# Patient Record
(demographics unavailable — no encounter records)

---

## 2024-10-21 NOTE — PHYSICAL EXAM
[Well Developed] : well developed [Well Nourished] : well nourished [No Acute Distress] : no acute distress [Normal Conjunctiva] : normal conjunctiva [Normal Venous Pressure] : normal venous pressure [No Carotid Bruit] : no carotid bruit [Normal S1, S2] : normal S1, S2 [No Murmur] : no murmur [No Rub] : no rub [No Gallop] : no gallop [Clear Lung Fields] : clear lung fields [Good Air Entry] : good air entry [No Respiratory Distress] : no respiratory distress  [Soft] : abdomen soft [Non Tender] : non-tender [No Masses/organomegaly] : no masses/organomegaly [Normal Bowel Sounds] : normal bowel sounds [Normal Gait] : normal gait [No Edema] : no edema [No Cyanosis] : no cyanosis [No Clubbing] : no clubbing [No Varicosities] : no varicosities [No Rash] : no rash [No Skin Lesions] : no skin lesions [Moves all extremities] : moves all extremities [No Focal Deficits] : no focal deficits [Normal Speech] : normal speech [Alert and Oriented] : alert and oriented [Normal memory] : normal memory [de-identified] : Sternal scar with keloid formation

## 2024-10-21 NOTE — HISTORY OF PRESENT ILLNESS
[FreeTextEntry1] : 73 yo man presents for CV follow-up, s/p CABG surgery.   Contact info: Cell [phone], Josi Sheffield. Home is [phone].  H/o CAD, s/p MI and PCI  to OM1 2009. Former smoker; alcohol abuse.  Due to worsening angina, cardiac catheterization (Dr. Dashawn Kauffman, [facility]) 3/16/21: 20% left main 60% oLAD, 40%pLAD (iFR 0.89 signiifcant) 50%LCX 20%pRCA No LAD PCI performed (too complex). He was evaluated by Dr. Robert Higginbotham ([facility] cardiac surgery) and underwent CABG X 2 (LIMA to LAD, SVG to OM) on 4/28/21. Normal LVEF by echo. Post op course notable for AFib, converted back to SR.   Labs April 2023: A1c 5.8% TSH normal tchol 144, trig 190, HDL 36, LDL 70 mg/dL CMP normal  At his last visit with me in Sept 2024, the following issues were discussed: CAD (coronary artery disease) (414.00) (I25.10) CAD s/p CABG 2021; did well post-op, brief AFib. No further AFib. Continue with     aggressive secondary prevention, including diet, increasing exercise, med rx, weight     loss. His sternal chest discomfort is likely from keloid formation. He has stopped     drinking alcohol; his BP is stable; compliant with medications. Dyslipidemia (272.4) (E78.5) Continue with high-dose statin therapy + diet. Labs f/b PCP. Goal LDL-C ~50 mg/dL. Hypertension (401.9) (I10) BP under good control, yamile now not drinking alcohol. Continue to watch. Will d/c metop     given bradycardia, and monitor BP. Peripheral vascular disease (443.9) (I73.9) Bilateral, with back discomfort. Symptoms are c/w back issues and vein excision site.     Will check RORY's, regardless. S/P CABG x 2 (V45.81) (Z95.1) Doing well s/p CABG; doing well. No angina. Needs more exercise; weight control. Sinus bradycardia (427.89) (R00.1) Sinus yevgeniy to the 40's. Perhaps some mild dizziness. BP low/normal. Instructed him /     wife to stop metoprolol and RTC in one month, or sooner if needed. Obesity, unspecified classification, unspecified obesity type, unspecified whether serious comorbidity present (278.00) (E66.9) Suggest weight control, including less fried chicken.  Since his last visit, Mr. Sheffield, doing fine. He stopped the metoprolol at last visit, as instructed.  Continues to have itching at sternal scar site. Does not check BP at home. Compliant with medications.  Labs are f/b PCP.  EKG today:  Sinus Rhythm  LAFB Nonspecific T wave abnormalities ABNORMAL

## 2024-10-21 NOTE — HISTORY OF PRESENT ILLNESS
[FreeTextEntry1] : 73 yo man presents for CV follow-up, s/p CABG surgery.   Contact info: Cell [phone], Josi Sheffield. Home is [phone].  H/o CAD, s/p MI and PCI  to OM1 2009. Former smoker; alcohol abuse.  Due to worsening angina, cardiac catheterization (Dr. Dashawn Kauffman, [facility]) 3/16/21: 20% left main 60% oLAD, 40%pLAD (iFR 0.89 signiifcant) 50%LCX 20%pRCA No LAD PCI performed (too complex). He was evaluated by Dr. Robert Higginbotham ([facility] cardiac surgery) and underwent CABG X 2 (LIMA to LAD, SVG to OM) on 4/28/21. Normal LVEF by echo. Post op course notable for AFib, converted back to SR.   Labs April 2023: A1c 5.8% TSH normal tchol 144, trig 190, HDL 36, LDL 70 mg/dL CMP normal  At his last visit with me in Sept 2024, the following issues were discussed: CAD (coronary artery disease) (414.00) (I25.10) CAD s/p CABG 2021; did well post-op, brief AFib. No further AFib. Continue with     aggressive secondary prevention, including diet, increasing exercise, med rx, weight     loss. His sternal chest discomfort is likely from keloid formation. He has stopped     drinking alcohol; his BP is stable; compliant with medications. Dyslipidemia (272.4) (E78.5) Continue with high-dose statin therapy + diet. Labs f/b PCP. Goal LDL-C ~50 mg/dL. Hypertension (401.9) (I10) BP under good control, yamile now not drinking alcohol. Continue to watch. Will d/c metop     given bradycardia, and monitor BP. Peripheral vascular disease (443.9) (I73.9) Bilateral, with back discomfort. Symptoms are c/w back issues and vein excision site.     Will check RORY's, regardless. S/P CABG x 2 (V45.81) (Z95.1) Doing well s/p CABG; doing well. No angina. Needs more exercise; weight control. Sinus bradycardia (427.89) (R00.1) Sinus yevgeniy to the 40's. Perhaps some mild dizziness. BP low/normal. Instructed him /     wife to stop metoprolol and RTC in one month, or sooner if needed. Obesity, unspecified classification, unspecified obesity type, unspecified whether serious comorbidity present (278.00) (E66.9) Suggest weight control, including less fried chicken.  Since his last visit, Mr. Sheffield, doing fine. He stopped the metoprolol at last visit, as instructed.  Continues to have itching at sternal scar site. Does not check BP at home. Compliant with medications.  Labs are f/b PCP.  EKG today:  Sinus Rhythm  LAFB Nonspecific T wave abnormalities ABNORMAL

## 2024-10-21 NOTE — PHYSICAL EXAM
[Well Developed] : well developed [Well Nourished] : well nourished [No Acute Distress] : no acute distress [Normal Conjunctiva] : normal conjunctiva [Normal Venous Pressure] : normal venous pressure [No Carotid Bruit] : no carotid bruit [Normal S1, S2] : normal S1, S2 [No Murmur] : no murmur [No Rub] : no rub [No Gallop] : no gallop [Clear Lung Fields] : clear lung fields [Good Air Entry] : good air entry [No Respiratory Distress] : no respiratory distress  [Soft] : abdomen soft [Non Tender] : non-tender [No Masses/organomegaly] : no masses/organomegaly [Normal Bowel Sounds] : normal bowel sounds [Normal Gait] : normal gait [No Edema] : no edema [No Cyanosis] : no cyanosis [No Clubbing] : no clubbing [No Varicosities] : no varicosities [No Rash] : no rash [No Skin Lesions] : no skin lesions [Moves all extremities] : moves all extremities [No Focal Deficits] : no focal deficits [Normal Speech] : normal speech [Alert and Oriented] : alert and oriented [Normal memory] : normal memory [de-identified] : Sternal scar with keloid formation

## 2024-10-21 NOTE — REVIEW OF SYSTEMS
[SOB] : no shortness of breath [Dyspnea on exertion] : dyspnea during exertion [Chest Discomfort] : no chest discomfort [Lower Ext Edema] : no extremity edema [Leg Claudication] : no intermittent leg claudication [Palpitations] : no palpitations [Orthopnea] : no orthopnea [Syncope] : no syncope [Negative] : Heme/Lymph [FreeTextEntry9] : mild back discomfort

## 2025-02-10 NOTE — HISTORY OF PRESENT ILLNESS
[FreeTextEntry1] : 72 yo man presents for CV follow-up, s/p CABG surgery.   Contact info: Cell [phone], Josi Sheffield. Home is [phone].  H/o CAD, s/p MI and PCI  to OM1 2009. Former smoker; alcohol abuse.  Due to worsening angina, cardiac catheterization (Dr. Dashawn Kauffman, [facility]) 3/16/21: 20% left main 60% oLAD, 40%pLAD (iFR 0.89 signiifcant) 50%LCX 20%pRCA No LAD PCI performed (too complex). He was evaluated by Dr. Robert Higginbotham ([facility] cardiac surgery) and underwent CABG X 2 (LIMA to LAD, SVG to OM) on 4/28/21. Normal LVEF by echo. Post op course notable for AFib, converted back to SR.   Labs April 2023: A1c 5.8% TSH normal tchol 144, trig 190, HDL 36, LDL 70 mg/dL CMP normal  At his last visit with me in October 2024, the following issues were discussed: CAD (coronary artery disease) (414.00) (I25.10) CAD s/p CABG 2021; did well post-op, brief AFib. No further AFib. Continue with     aggressive secondary prevention, including diet, increasing exercise, med rx, weight     loss. His sternal chest discomfort is from keloid formation. He has stopped drinking     alcohol, thankfully; his BP is stable; compliant with medications. Dyslipidemia (272.4) (E78.5) Continue with high-dose statin therapy + diet. Labs f/b PCP. Goal LDL-C ~50 mg/dL. Hypertension (401.9) (I10) BP under good control, yamile now not drinking alcohol. Continue to monitor, especially     now that he is off the bblocker. Peripheral vascular disease (443.9) (I73.9) Bilateral, with back discomfort; somewhat improved as of late. Symptoms are c/w back     issues and vein excision site. Will check RORY results; done today. S/P CABG x 2 (V45.81) (Z95.1) Doing well s/p CABG; doing well. No angina. Needs more exercise; weight control. No     indication for CV testing at this time. History of sinus bradycardia (V12.59) (Z86.79) In past, sinus yevgeniy to the 40's. Now, HR in the 60's off metoprolol. Continue to monitor     off bblockers.  RORY's 10/2024: Right Lower Extremity Arteries: Resting right ankle-brachial index is within normal range. No evidence of significant right lower extremity arterial disease. The right toe-brachial index is normal. Pulse volume recording (PVR) waveforms appear multiphasic with normal amplitudes throughout the right lower extremity. There is no significant decrease in segmental pressures between arterial segments. Left Lower Extremity Arteries: Resting left ankle-brachial index is within normal range. No evidence of significant left lower extremity arterial disease. The left toe-brachial index is normal. Pulse volume recording (PVR) waveforms appear multiphasic with normal amplitudes throughout the left lower extremity. There is no significant decrease in segmental pressures between arterial segments.  Since his last visit, Mr. Sheffield has been doing well.  Weight stable. Drinking soda, with sugar. Eats small amount of food. Compliant with medications.  Not much exercise. No alcohol. No CV symptoms. Labs are f/b PCP, recently.  EKG today:  SR, LAD, nonspecific STT abn's

## 2025-02-10 NOTE — PHYSICAL EXAM
[Well Developed] : well developed [Well Nourished] : well nourished [No Acute Distress] : no acute distress [Normal Conjunctiva] : normal conjunctiva [Normal Venous Pressure] : normal venous pressure [No Carotid Bruit] : no carotid bruit [Normal S1, S2] : normal S1, S2 [No Murmur] : no murmur [No Rub] : no rub [No Gallop] : no gallop [Clear Lung Fields] : clear lung fields [Good Air Entry] : good air entry [No Respiratory Distress] : no respiratory distress  [Soft] : abdomen soft [Non Tender] : non-tender [No Masses/organomegaly] : no masses/organomegaly [Normal Bowel Sounds] : normal bowel sounds [Normal Gait] : normal gait [No Edema] : no edema [No Cyanosis] : no cyanosis [No Clubbing] : no clubbing [No Varicosities] : no varicosities [No Rash] : no rash [No Skin Lesions] : no skin lesions [Moves all extremities] : moves all extremities [No Focal Deficits] : no focal deficits [Normal Speech] : normal speech [Alert and Oriented] : alert and oriented [Normal memory] : normal memory [de-identified] : Sternal scar with keloid formation

## 2025-02-10 NOTE — PHYSICAL EXAM
[Well Developed] : well developed [Well Nourished] : well nourished [No Acute Distress] : no acute distress [Normal Conjunctiva] : normal conjunctiva [Normal Venous Pressure] : normal venous pressure [No Carotid Bruit] : no carotid bruit [Normal S1, S2] : normal S1, S2 [No Murmur] : no murmur [No Rub] : no rub [No Gallop] : no gallop [Clear Lung Fields] : clear lung fields [Good Air Entry] : good air entry [No Respiratory Distress] : no respiratory distress  [Soft] : abdomen soft [Non Tender] : non-tender [No Masses/organomegaly] : no masses/organomegaly [Normal Bowel Sounds] : normal bowel sounds [Normal Gait] : normal gait [No Edema] : no edema [No Cyanosis] : no cyanosis [No Clubbing] : no clubbing [No Varicosities] : no varicosities [No Rash] : no rash [No Skin Lesions] : no skin lesions [Moves all extremities] : moves all extremities [No Focal Deficits] : no focal deficits [Normal Speech] : normal speech [Alert and Oriented] : alert and oriented [Normal memory] : normal memory [de-identified] : Sternal scar with keloid formation

## 2025-02-10 NOTE — HISTORY OF PRESENT ILLNESS
[FreeTextEntry1] : 72 yo man presents for CV follow-up, s/p CABG surgery.   Contact info: Cell [phone], Josi Sheffield. Home is [phone].  H/o CAD, s/p MI and PCI  to OM1 2009. Former smoker; alcohol abuse.  Due to worsening angina, cardiac catheterization (Dr. Dashawn Kauffman, [facility]) 3/16/21: 20% left main 60% oLAD, 40%pLAD (iFR 0.89 signiifcant) 50%LCX 20%pRCA No LAD PCI performed (too complex). He was evaluated by Dr. Robert Higginbotham ([facility] cardiac surgery) and underwent CABG X 2 (LIMA to LAD, SVG to OM) on 4/28/21. Normal LVEF by echo. Post op course notable for AFib, converted back to SR.   Labs April 2023: A1c 5.8% TSH normal tchol 144, trig 190, HDL 36, LDL 70 mg/dL CMP normal  At his last visit with me in October 2024, the following issues were discussed: CAD (coronary artery disease) (414.00) (I25.10) CAD s/p CABG 2021; did well post-op, brief AFib. No further AFib. Continue with     aggressive secondary prevention, including diet, increasing exercise, med rx, weight     loss. His sternal chest discomfort is from keloid formation. He has stopped drinking     alcohol, thankfully; his BP is stable; compliant with medications. Dyslipidemia (272.4) (E78.5) Continue with high-dose statin therapy + diet. Labs f/b PCP. Goal LDL-C ~50 mg/dL. Hypertension (401.9) (I10) BP under good control, yamile now not drinking alcohol. Continue to monitor, especially     now that he is off the bblocker. Peripheral vascular disease (443.9) (I73.9) Bilateral, with back discomfort; somewhat improved as of late. Symptoms are c/w back     issues and vein excision site. Will check RORY results; done today. S/P CABG x 2 (V45.81) (Z95.1) Doing well s/p CABG; doing well. No angina. Needs more exercise; weight control. No     indication for CV testing at this time. History of sinus bradycardia (V12.59) (Z86.79) In past, sinus yevgeniy to the 40's. Now, HR in the 60's off metoprolol. Continue to monitor     off bblockers.  RORY's 10/2024: Right Lower Extremity Arteries: Resting right ankle-brachial index is within normal range. No evidence of significant right lower extremity arterial disease. The right toe-brachial index is normal. Pulse volume recording (PVR) waveforms appear multiphasic with normal amplitudes throughout the right lower extremity. There is no significant decrease in segmental pressures between arterial segments. Left Lower Extremity Arteries: Resting left ankle-brachial index is within normal range. No evidence of significant left lower extremity arterial disease. The left toe-brachial index is normal. Pulse volume recording (PVR) waveforms appear multiphasic with normal amplitudes throughout the left lower extremity. There is no significant decrease in segmental pressures between arterial segments.  Since his last visit, Mr. Sheffield has been doing well.  Weight stable. Drinking soda, with sugar. Eats small amount of food. Compliant with medications.  Not much exercise. No alcohol. No CV symptoms. Labs are f/b PCP, recently.  EKG today:  SR, LAD, nonspecific STT abn's